# Patient Record
Sex: MALE | Race: WHITE
[De-identification: names, ages, dates, MRNs, and addresses within clinical notes are randomized per-mention and may not be internally consistent; named-entity substitution may affect disease eponyms.]

---
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
## 2023-06-30 NOTE — PD
HPI


Chief Complaint:  Flank/Kidney Pain


Time Seen by Provider:  13:17


Travel History


International Travel<30 days:  No


Contact w/Intl Traveler<30days:  No


Traveled to known affect area:  No





History of Present Illness


HPI


Patient is a 12-year-old male who presents to emergency room with his mother 

for evaluation of bilateral flank pain.  Patient reports that he has been 

having intermittent bilateral flank pain since last night, patient reports that 

he currently has no pain.  Mom reports that patient had ureteral stenosis at 2 

years old which required surgical correction for this.  He has not follow-up 

with urology since she was 2 years old as he did not require further evaluation 

for this.  Mom was concerned for a possible obstruction at this time.  Patient 

at this time denies any fevers or chills, denies any nausea or vomiting.  

Patient denies any abdominal pain or testicular pain.  Patient denies any 

dysuria, urinary urgency or frequency or hematuria.  Reports no complaints at 

this time.





History


Past Medical History


ADD:  Yes


ADHD:  Yes


Asthma:  Yes


Diabetes:  No


Genitourinary:  Yes (LEFT KIDNEY)


Hearing:  No


Psychiatric:  Yes (MOOD DISORDER)


Respiratory:  Yes


Immunizations Current:  Yes


Ulcer:  No


Vision or Eye Problem:  No





Past Surgical History


Genitourinary Surgery:  Yes (URETER)


Other Surgery:  Yes (FOR KIDNEY BLOCKAGE)





Social History


Attends:  School


Tobacco Use in Home:  No


Alcohol Use:  No


Tobacco Use:  No


Substance Use:  No





Allergies-Medications


(Allergen,Severity, Reaction):  


Coded Allergies:  


     amoxicillin (Unverified  Allergy, Severe, RASH, 11/14/17)


     erythromycin base (Unverified  Allergy, Severe, RASH, 11/14/17)


     hornet venom (Unverified  Allergy, Unknown, RASH, 11/14/17)


     penicillin G (Unverified  Allergy, Unknown, RASH, 11/14/17)


Reported Meds & Prescriptions





Reported Meds & Active Scripts


Active


Prozac (Fluoxetine HCl) 10 Mg Cap 10 Mg PO DAILY


Clonidine (Clonidine HCl) 0.1 Mg Tab 0.1 Mg PO AS DIRECTED


     1/2 qam and 1/2 q4pm and 1mghs


Prozac (Fluoxetine HCl) 20 Mg Cap 20 Mg PO DAILY








ROS


Constitutional:  No: Fever


Eyes:  No: Drainage


HENT:  No: Congestion


Cardiovascular:  No: Cyanosis


Respiratory:  No: Cough


Gastrointestinal:  No: Nausea, Vomiting, Diarrhea, Abdominal Pain


Genitourinary:  Positive: Flank Pain, No: Urgency, Dysuria, Decreased Urinary 

Output, Pelvic Pain


Musculoskeletal:  No: Edema


Skin:  No Rash


Neurologic:  No: Change in Mentation


Psychiatric:  No: Depression


Endocrine:  No: Polyuria, Polydipsia


Hematologic:  No: Easy Bruising





Physical Exam


Narrative


GENERAL: No acute distress, nontoxic


SKIN: Focused skin assessment warm/dry.


HEAD: Atraumatic. Normocephalic. 


EYES: Pupils equal and round. No scleral icterus. No injection or drainage. 


ENT: No nasal bleeding or discharge.  Mucous membranes pink and moist.


NECK: Trachea midline. No JVD. 


CARDIOVASCULAR: Regular rate and rhythm.  No murmur appreciated.


RESPIRATORY: No accessory muscle use. Clear to auscultation. Breath sounds 

equal bilaterally. 


GASTROINTESTINAL: Abdomen soft, non-tender, nondistended. Hepatic and splenic 

margins not palpable. 


MUSCULOSKELETAL: No obvious deformities. No clubbing.  No cyanosis.  No edema.  

Patient with no flank pain on exam


NEUROLOGICAL: Awake and alert. No obvious cranial nerve deficits.  Motor 

grossly within normal limits. Normal speech.


PSYCHIATRIC: Appropriate mood and affect; insight and judgment normal.





Data


Data


Last Documented VS





Vital Signs








  Date Time  Temp Pulse Resp B/P (MAP) Pulse Ox O2 Delivery O2 Flow Rate FiO2


 


11/14/17 13:09 97.5 71 18 142/82 (102) 98   








Orders





 Orders


Urinalysis - C+S If Indicated (11/14/17 13:17)


Complete Blood Count With Diff (11/14/17 13:28)


Comprehensive Metabolic Panel (11/14/17 13:28)


Us Kidney/Renal/Bladder (11/14/17 )


Iv Access Insert/Monitor (11/14/17 13:28)


Sodium Chlor 0.9% 1000 Ml Inj (Ns 1000 M (11/14/17 13:28)


Sodium Chloride 0.9% Flush (Ns Flush) (11/14/17 13:30)





Labs





Laboratory Tests








Test


  11/14/17


13:30 11/14/17


14:00


 


Urine Collection Type CLEAN CATCH  


 


Urine Color YELLOW  


 


Urine Turbidity CLEAR  


 


Urine pH 5.5  


 


Urine Specific Gravity 1.031  


 


Urine Protein NEG mg/dL  


 


Urine Glucose (UA) NEG mg/dL  


 


Urine Ketones NEG mg/dL  


 


Urine Occult Blood NEG  


 


Urine Nitrite NEG  


 


Urine Bilirubin NEG  


 


Urine Leukocyte Esterase NEG  


 


Urine WBC 0-2 /hpf  


 


Microscopic Urinalysis Comment


  CULT NOT


INDICATED 


 


 


White Blood Count  10.4 TH/MM3 


 


Red Blood Count  5.61 MIL/MM3 


 


Hemoglobin  14.3 GM/DL 


 


Hematocrit  43.7 % 


 


Mean Corpuscular Volume  77.9 FL 


 


Mean Corpuscular Hemoglobin  25.5 PG 


 


Mean Corpuscular Hemoglobin


Concent 


  32.8 % 


 


 


Red Cell Distribution Width  13.3 % 


 


Platelet Count  338 TH/MM3 


 


Mean Platelet Volume  9.6 FL 


 


Neutrophils (%) (Auto)  56.8 % 


 


Lymphocytes (%) (Auto)  33.8 % 


 


Monocytes (%) (Auto)  5.5 % 


 


Eosinophils (%) (Auto)  2.2 % 


 


Basophils (%) (Auto)  1.7 % 


 


Neutrophils # (Auto)  5.9 TH/MM3 


 


Lymphocytes # (Auto)  3.5 TH/MM3 


 


Monocytes # (Auto)  0.6 TH/MM3 


 


Eosinophils # (Auto)  0.2 TH/MM3 


 


Basophils # (Auto)  0.2 TH/MM3 


 


CBC Comment  DIFF FINAL 


 


Differential Comment   


 


Blood Urea Nitrogen  16 MG/DL 


 


Creatinine  0.63 MG/DL 


 


Random Glucose  79 MG/DL 


 


Total Protein  7.7 GM/DL 


 


Albumin  3.7 GM/DL 


 


Calcium Level  9.0 MG/DL 


 


Alkaline Phosphatase  290 U/L 


 


Aspartate Amino Transf


(AST/SGOT) 


  18 U/L 


 


 


Alanine Aminotransferase


(ALT/SGPT) 


  31 U/L 


 


 


Total Bilirubin  0.4 MG/DL 


 


Sodium Level  137 MEQ/L 


 


Potassium Level  3.9 MEQ/L 


 


Chloride Level  103 MEQ/L 


 


Carbon Dioxide Level  23.8 MEQ/L 


 


Anion Gap  10 MEQ/L 











MDM


Medical Decision Making


Medical Screen Exam Complete:  Yes


Emergency Medical Condition:  Yes


Medical Record Reviewed:  Yes


Interpretation(s)





Vital Signs








  Date Time  Temp Pulse Resp B/P (MAP) Pulse Ox O2 Delivery O2 Flow Rate FiO2


 


11/14/17 13:09 97.5 71 18 142/82 (102) 98   








Differential Diagnosis


Kidney stones, hydronephrosis, UTI, electrolyte abnormality


Narrative Course


12-year-old nontoxic male presents to emergency room complaints of bilateral 

flank pain.  Patient at this time is comfortable with no pain, no flank pain, 

no abdominal pain, no nausea or vomiting. 





During the course of the patients emergency department visit, the patients 

history, examination, and differential diagnosis were reviewed with the 

patient. The patient was placed on a cardiac monitor with oximetry and frequent 

blood pressure monitoring. The patient had a 20-gauge IV access obtained and 

blood work sent for analysis. 





The patient was initially provided IVF. 





The patients laboratory studies were reviewed and remarkable for:








Laboratory Tests








Test


  11/14/17


13:30


 


Urine Collection Type CLEAN CATCH 


 


Urine Color


  YELLOW


(YELLW/STRAW)


 


Urine Turbidity CLEAR (CLEAR) 


 


Urine pH 5.5 (5.0-8.5) 


 


Urine Specific Gravity


  1.031


(1.002-1.035)


 


Urine Protein


  NEG mg/dL


(NEG-TRACE)


 


Urine Glucose (UA)


  NEG mg/dL


(NEG)


 


Urine Ketones


  NEG mg/dL


(NEG)


 


Urine Occult Blood NEG (NEG) 


 


Urine Nitrite NEG (NEG) 


 


Urine Bilirubin NEG (NEG) 


 


Urine Leukocyte Esterase NEG (NEG) 


 


Urine WBC 0-2 /hpf (0-5) 


 


Microscopic Urinalysis Comment


  CULT NOT


INDICATED








CBC & BMP Diagram


11/14/17 14:00








Total Protein 7.7, Albumin 3.7, Calcium Level 9.0, Alkaline Phosphatase 290, 

Aspartate Amino Transf (AST/SGOT) 18, Alanine Aminotransferase (ALT/SGPT) 31, 

Total Bilirubin 0.4





Patient with normal kidney functions, benign laboratory work








Radiology studies were reviewed and remarkable for


Last Impressions








Renal Ultrasound 11/14/17 0000 Signed





Impressions: 





 Service Date/Time:  Tuesday, November 14, 2017 13:40 - CONCLUSION:  Negative 





 exam.     Chris Shin MD 





Renal ultrasound with no evidence of obstruction, normal exam.





Patient with no pain at this time, patient with no complaints.  Reviewed all 

labs and all studies with patient's mother in detail along with patient.  Plan 

to have patient follow up with his primary care doctor and will have him return 

to the emergency room as needed.





Diagnosis





 Primary Impression:  


 Bilateral flank pain


Patient Instructions:  General Instructions


Departure Forms:  School Release,    Return to School Date:  Nov 15, 2017


   


   Tests/Procedures





***Additional Instructions:  


**Please provide patient with a copy of their lab work and studies at discharge*

*





Please follow up with your primary care doctor in 2-3 days





Return to the ER if symptoms worsen or progress





Return to the ER as needed


Disposition:  01 DISCHARGE HOME


Condition:  Stable





__________________________________________________


Primary Care Physician


FRANCO Hammer Jennifer L DO Nov 14, 2017 13:36 today